# Patient Record
Sex: FEMALE | Race: WHITE | NOT HISPANIC OR LATINO | Employment: UNEMPLOYED | URBAN - METROPOLITAN AREA
[De-identification: names, ages, dates, MRNs, and addresses within clinical notes are randomized per-mention and may not be internally consistent; named-entity substitution may affect disease eponyms.]

---
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
## 2023-06-21 ENCOUNTER — TELEPHONE (OUTPATIENT)
Age: 18
End: 2023-06-21

## 2023-06-30 NOTE — TELEPHONE ENCOUNTER
06/30/23 2:14 PM        The office's request has been received, reviewed, and the patient chart updated. The PCP has successfully been removed with a patient attribution note. This message will now be completed.         Thank you  Danilo Machuca

## 2024-09-24 ENCOUNTER — TELEPHONE (OUTPATIENT)
Age: 19
End: 2024-09-24

## 2024-09-24 NOTE — TELEPHONE ENCOUNTER
Patient's mother called for new patient appointment for wart on the finger.  Offered soonest appointment.  Patient will seek treatment at another location.

## 2025-02-17 ENCOUNTER — TELEPHONE (OUTPATIENT)
Age: 20
End: 2025-02-17

## 2025-02-17 NOTE — TELEPHONE ENCOUNTER
Attempted to call patient the phone call rang then dropped ro move her appt 4/14/25 over from Dr Mena to ANNI Del Angel's schedule. If patient calls back please office an appt with Bean on the same day at the [facility].

## 2025-02-20 ENCOUNTER — TELEPHONE (OUTPATIENT)
Age: 20
End: 2025-02-20

## 2025-02-20 NOTE — TELEPHONE ENCOUNTER
Unable to reach patient for appt 4/14/25. Due to the providers schedule change we have moved your appt from Dr Mena to Bean's schedule for the same day 4/14/25 and location [place] but the time has changed to 940am. Patient was mailed a letter with the new appt info.

## 2025-03-03 ENCOUNTER — OFFICE VISIT (OUTPATIENT)
Dept: URGENT CARE | Facility: CLINIC | Age: 20
End: 2025-03-03
Payer: COMMERCIAL

## 2025-03-03 VITALS
BODY MASS INDEX: 20.61 KG/M2 | OXYGEN SATURATION: 100 % | HEART RATE: 82 BPM | HEIGHT: 62 IN | TEMPERATURE: 97.5 F | WEIGHT: 112 LBS

## 2025-03-03 DIAGNOSIS — H00.011 HORDEOLUM EXTERNUM RIGHT UPPER EYELID: Primary | ICD-10-CM

## 2025-03-03 PROCEDURE — 99203 OFFICE O/P NEW LOW 30 MIN: CPT | Performed by: PHYSICIAN ASSISTANT

## 2025-03-03 RX ORDER — ERYTHROMYCIN 5 MG/G
0.5 OINTMENT OPHTHALMIC
Qty: 3.5 G | Refills: 0 | Status: SHIPPED | OUTPATIENT
Start: 2025-03-03 | End: 2025-03-10
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
## 2025-08-19 ENCOUNTER — TELEPHONE (OUTPATIENT)
Age: 20
End: 2025-08-19

## 2025-08-21 ENCOUNTER — OFFICE VISIT (OUTPATIENT)
Age: 20
End: 2025-08-21

## 2025-08-21 VITALS — BODY MASS INDEX: 20.68 KG/M2 | WEIGHT: 112.4 LBS | HEIGHT: 62 IN

## 2025-08-21 DIAGNOSIS — B07.9 VERRUCA VULGARIS: Primary | ICD-10-CM

## 2025-08-21 RX ORDER — MEDROXYPROGESTERONE ACETATE 5 MG
TABLET ORAL
COMMUNITY
Start: 2025-08-15